# Patient Record
Sex: FEMALE | Race: WHITE
[De-identification: names, ages, dates, MRNs, and addresses within clinical notes are randomized per-mention and may not be internally consistent; named-entity substitution may affect disease eponyms.]

---

## 2020-10-17 ENCOUNTER — HOSPITAL ENCOUNTER (INPATIENT)
Dept: HOSPITAL 15 - ER | Age: 25
LOS: 2 days | DRG: 812 | End: 2020-10-19
Attending: HOSPITALIST | Admitting: HOSPITALIST
Payer: COMMERCIAL

## 2020-10-17 VITALS — SYSTOLIC BLOOD PRESSURE: 117 MMHG | DIASTOLIC BLOOD PRESSURE: 69 MMHG

## 2020-10-17 VITALS — DIASTOLIC BLOOD PRESSURE: 58 MMHG | SYSTOLIC BLOOD PRESSURE: 121 MMHG

## 2020-10-17 VITALS — DIASTOLIC BLOOD PRESSURE: 61 MMHG | SYSTOLIC BLOOD PRESSURE: 117 MMHG

## 2020-10-17 VITALS — DIASTOLIC BLOOD PRESSURE: 55 MMHG | SYSTOLIC BLOOD PRESSURE: 96 MMHG

## 2020-10-17 VITALS — SYSTOLIC BLOOD PRESSURE: 98 MMHG | DIASTOLIC BLOOD PRESSURE: 44 MMHG

## 2020-10-17 VITALS — SYSTOLIC BLOOD PRESSURE: 101 MMHG | DIASTOLIC BLOOD PRESSURE: 49 MMHG

## 2020-10-17 VITALS — DIASTOLIC BLOOD PRESSURE: 66 MMHG | SYSTOLIC BLOOD PRESSURE: 113 MMHG

## 2020-10-17 VITALS — SYSTOLIC BLOOD PRESSURE: 92 MMHG | DIASTOLIC BLOOD PRESSURE: 53 MMHG

## 2020-10-17 VITALS — DIASTOLIC BLOOD PRESSURE: 52 MMHG | SYSTOLIC BLOOD PRESSURE: 114 MMHG

## 2020-10-17 VITALS — SYSTOLIC BLOOD PRESSURE: 94 MMHG | DIASTOLIC BLOOD PRESSURE: 48 MMHG

## 2020-10-17 VITALS — SYSTOLIC BLOOD PRESSURE: 88 MMHG | DIASTOLIC BLOOD PRESSURE: 43 MMHG

## 2020-10-17 VITALS — SYSTOLIC BLOOD PRESSURE: 87 MMHG | DIASTOLIC BLOOD PRESSURE: 41 MMHG

## 2020-10-17 VITALS — SYSTOLIC BLOOD PRESSURE: 101 MMHG | DIASTOLIC BLOOD PRESSURE: 53 MMHG

## 2020-10-17 VITALS — SYSTOLIC BLOOD PRESSURE: 138 MMHG | DIASTOLIC BLOOD PRESSURE: 118 MMHG

## 2020-10-17 VITALS — SYSTOLIC BLOOD PRESSURE: 102 MMHG | DIASTOLIC BLOOD PRESSURE: 56 MMHG

## 2020-10-17 VITALS — WEIGHT: 235.01 LBS | BODY MASS INDEX: 36.89 KG/M2 | HEIGHT: 67 IN

## 2020-10-17 VITALS — SYSTOLIC BLOOD PRESSURE: 99 MMHG | DIASTOLIC BLOOD PRESSURE: 64 MMHG

## 2020-10-17 VITALS — DIASTOLIC BLOOD PRESSURE: 67 MMHG | SYSTOLIC BLOOD PRESSURE: 129 MMHG

## 2020-10-17 VITALS — SYSTOLIC BLOOD PRESSURE: 86 MMHG | DIASTOLIC BLOOD PRESSURE: 51 MMHG

## 2020-10-17 VITALS — DIASTOLIC BLOOD PRESSURE: 65 MMHG | SYSTOLIC BLOOD PRESSURE: 106 MMHG

## 2020-10-17 VITALS — SYSTOLIC BLOOD PRESSURE: 98 MMHG | DIASTOLIC BLOOD PRESSURE: 51 MMHG

## 2020-10-17 VITALS — DIASTOLIC BLOOD PRESSURE: 44 MMHG | SYSTOLIC BLOOD PRESSURE: 97 MMHG

## 2020-10-17 VITALS — SYSTOLIC BLOOD PRESSURE: 78 MMHG | DIASTOLIC BLOOD PRESSURE: 37 MMHG

## 2020-10-17 VITALS — SYSTOLIC BLOOD PRESSURE: 100 MMHG | DIASTOLIC BLOOD PRESSURE: 37 MMHG

## 2020-10-17 VITALS — SYSTOLIC BLOOD PRESSURE: 91 MMHG | DIASTOLIC BLOOD PRESSURE: 22 MMHG

## 2020-10-17 VITALS — DIASTOLIC BLOOD PRESSURE: 46 MMHG | SYSTOLIC BLOOD PRESSURE: 92 MMHG

## 2020-10-17 VITALS — DIASTOLIC BLOOD PRESSURE: 57 MMHG | SYSTOLIC BLOOD PRESSURE: 96 MMHG

## 2020-10-17 VITALS — SYSTOLIC BLOOD PRESSURE: 109 MMHG | DIASTOLIC BLOOD PRESSURE: 66 MMHG

## 2020-10-17 VITALS — SYSTOLIC BLOOD PRESSURE: 98 MMHG | DIASTOLIC BLOOD PRESSURE: 42 MMHG

## 2020-10-17 VITALS — DIASTOLIC BLOOD PRESSURE: 67 MMHG | SYSTOLIC BLOOD PRESSURE: 130 MMHG

## 2020-10-17 VITALS — SYSTOLIC BLOOD PRESSURE: 82 MMHG | DIASTOLIC BLOOD PRESSURE: 35 MMHG

## 2020-10-17 VITALS — SYSTOLIC BLOOD PRESSURE: 92 MMHG | DIASTOLIC BLOOD PRESSURE: 46 MMHG

## 2020-10-17 VITALS — DIASTOLIC BLOOD PRESSURE: 68 MMHG | SYSTOLIC BLOOD PRESSURE: 119 MMHG

## 2020-10-17 VITALS — SYSTOLIC BLOOD PRESSURE: 113 MMHG | DIASTOLIC BLOOD PRESSURE: 55 MMHG

## 2020-10-17 VITALS — DIASTOLIC BLOOD PRESSURE: 58 MMHG | SYSTOLIC BLOOD PRESSURE: 111 MMHG

## 2020-10-17 VITALS — SYSTOLIC BLOOD PRESSURE: 110 MMHG | DIASTOLIC BLOOD PRESSURE: 63 MMHG

## 2020-10-17 VITALS — DIASTOLIC BLOOD PRESSURE: 48 MMHG | SYSTOLIC BLOOD PRESSURE: 98 MMHG

## 2020-10-17 VITALS — DIASTOLIC BLOOD PRESSURE: 23 MMHG | SYSTOLIC BLOOD PRESSURE: 92 MMHG

## 2020-10-17 VITALS — SYSTOLIC BLOOD PRESSURE: 121 MMHG | DIASTOLIC BLOOD PRESSURE: 72 MMHG

## 2020-10-17 VITALS — DIASTOLIC BLOOD PRESSURE: 44 MMHG | SYSTOLIC BLOOD PRESSURE: 98 MMHG

## 2020-10-17 VITALS — SYSTOLIC BLOOD PRESSURE: 82 MMHG | DIASTOLIC BLOOD PRESSURE: 40 MMHG

## 2020-10-17 DIAGNOSIS — E87.2: ICD-10-CM

## 2020-10-17 DIAGNOSIS — K72.90: ICD-10-CM

## 2020-10-17 DIAGNOSIS — I46.9: ICD-10-CM

## 2020-10-17 DIAGNOSIS — R57.1: ICD-10-CM

## 2020-10-17 DIAGNOSIS — J96.90: ICD-10-CM

## 2020-10-17 DIAGNOSIS — I21.9: ICD-10-CM

## 2020-10-17 DIAGNOSIS — T51.91XA: ICD-10-CM

## 2020-10-17 DIAGNOSIS — Z83.3: ICD-10-CM

## 2020-10-17 DIAGNOSIS — T40.2X1A: ICD-10-CM

## 2020-10-17 DIAGNOSIS — G93.1: ICD-10-CM

## 2020-10-17 DIAGNOSIS — G92: ICD-10-CM

## 2020-10-17 DIAGNOSIS — G93.6: ICD-10-CM

## 2020-10-17 DIAGNOSIS — F11.10: ICD-10-CM

## 2020-10-17 DIAGNOSIS — F10.20: ICD-10-CM

## 2020-10-17 DIAGNOSIS — Z82.49: ICD-10-CM

## 2020-10-17 DIAGNOSIS — Z20.828: ICD-10-CM

## 2020-10-17 DIAGNOSIS — T39.1X1A: Primary | ICD-10-CM

## 2020-10-17 DIAGNOSIS — I95.9: ICD-10-CM

## 2020-10-17 DIAGNOSIS — K72.01: ICD-10-CM

## 2020-10-17 LAB
ALBUMIN SERPL-MCNC: 2.4 G/DL (ref 3.4–5)
ALBUMIN SERPL-MCNC: 3 G/DL (ref 3.4–5)
ALCOHOL, URINE: 186 MG/DL (ref 0–10)
ALP SERPL-CCNC: 243 U/L (ref 45–117)
ALP SERPL-CCNC: 94 U/L (ref 45–117)
ALT SERPL-CCNC: 2198 U/L (ref 13–56)
ALT SERPL-CCNC: 3497 U/L (ref 13–56)
AMPHETAMINES UR QL SCN: NEGATIVE
AMYLASE SERPL-CCNC: 264 U/L (ref 25–115)
ANION GAP SERPL CALCULATED.3IONS-SCNC: 19 MMOL/L (ref 5–15)
ANION GAP SERPL CALCULATED.3IONS-SCNC: 25 MMOL/L (ref 5–15)
APTT PPP: 54.4 SEC (ref 23–31.2)
BARBITURATES UR QL SCN: NEGATIVE
BENZODIAZ UR QL SCN: NEGATIVE
BILIRUB SERPL-MCNC: 0.4 MG/DL (ref 0.2–1)
BILIRUB SERPL-MCNC: 0.7 MG/DL (ref 0.2–1)
BUN SERPL-MCNC: 10 MG/DL (ref 7–18)
BUN SERPL-MCNC: 14 MG/DL (ref 7–18)
BUN/CREAT SERPL: 6.1
BUN/CREAT SERPL: 7
BZE UR QL SCN: NEGATIVE
CALCIUM SERPL-MCNC: 7.4 MG/DL (ref 8.5–10.1)
CALCIUM SERPL-MCNC: 9.5 MG/DL (ref 8.5–10.1)
CANNABINOIDS UR QL SCN: NEGATIVE
CHLORIDE SERPL-SCNC: 105 MMOL/L (ref 98–107)
CHLORIDE SERPL-SCNC: 111 MMOL/L (ref 98–107)
CO2 SERPL-SCNC: 13 MMOL/L (ref 21–32)
CO2 SERPL-SCNC: 17 MMOL/L (ref 21–32)
GLUCOSE SERPL-MCNC: 201 MG/DL (ref 74–106)
GLUCOSE SERPL-MCNC: 361 MG/DL (ref 74–106)
HCT VFR BLD AUTO: 40.9 % (ref 36–46)
HCT VFR BLD AUTO: 41.2 % (ref 36–46)
HGB BLD-MCNC: 12.2 G/DL (ref 12.2–16.2)
HGB BLD-MCNC: 12.9 G/DL (ref 12.2–16.2)
INR PPP: 1.32 (ref 0.9–1.15)
LACTATE PLASV-SCNC: 11.5 MMOL/L (ref 0.4–2)
LACTATE PLASV-SCNC: 18.9 MMOL/L (ref 0.4–2)
LIPASE SERPL-CCNC: 197 U/L (ref 73–393)
MCH RBC QN AUTO: 27.1 PG (ref 28–32)
MCH RBC QN AUTO: 27.4 PG (ref 28–32)
MCV RBC AUTO: 86.9 FL (ref 80–100)
MCV RBC AUTO: 91.2 FL (ref 80–100)
OPIATES UR QL SCN: NEGATIVE
PCP UR QL SCN: NEGATIVE
POTASSIUM SERPL-SCNC: 3.6 MMOL/L (ref 3.5–5.1)
POTASSIUM SERPL-SCNC: 5.5 MMOL/L (ref 3.5–5.1)
PROT SERPL-MCNC: 5.7 G/DL (ref 6.4–8.2)
PROT SERPL-MCNC: 6.5 G/DL (ref 6.4–8.2)
SODIUM SERPL-SCNC: 143 MMOL/L (ref 136–145)
SODIUM SERPL-SCNC: 147 MMOL/L (ref 136–145)

## 2020-10-17 PROCEDURE — 31500 INSERT EMERGENCY AIRWAY: CPT

## 2020-10-17 PROCEDURE — 83605 ASSAY OF LACTIC ACID: CPT

## 2020-10-17 PROCEDURE — 36415 COLL VENOUS BLD VENIPUNCTURE: CPT

## 2020-10-17 PROCEDURE — 96361 HYDRATE IV INFUSION ADD-ON: CPT

## 2020-10-17 PROCEDURE — 99291 CRITICAL CARE FIRST HOUR: CPT

## 2020-10-17 PROCEDURE — 87205 SMEAR GRAM STAIN: CPT

## 2020-10-17 PROCEDURE — 82150 ASSAY OF AMYLASE: CPT

## 2020-10-17 PROCEDURE — 87426 SARSCOV CORONAVIRUS AG IA: CPT

## 2020-10-17 PROCEDURE — 5A12012 PERFORMANCE OF CARDIAC OUTPUT, SINGLE, MANUAL: ICD-10-PCS | Performed by: HOSPITALIST

## 2020-10-17 PROCEDURE — 87040 BLOOD CULTURE FOR BACTERIA: CPT

## 2020-10-17 PROCEDURE — 85007 BL SMEAR W/DIFF WBC COUNT: CPT

## 2020-10-17 PROCEDURE — 83690 ASSAY OF LIPASE: CPT

## 2020-10-17 PROCEDURE — 85730 THROMBOPLASTIN TIME PARTIAL: CPT

## 2020-10-17 PROCEDURE — 70450 CT HEAD/BRAIN W/O DYE: CPT

## 2020-10-17 PROCEDURE — 84443 ASSAY THYROID STIM HORMONE: CPT

## 2020-10-17 PROCEDURE — 87070 CULTURE OTHR SPECIMN AEROBIC: CPT

## 2020-10-17 PROCEDURE — 82962 GLUCOSE BLOOD TEST: CPT

## 2020-10-17 PROCEDURE — 0BH17EZ INSERTION OF ENDOTRACHEAL AIRWAY INTO TRACHEA, VIA NATURAL OR ARTIFICIAL OPENING: ICD-10-PCS | Performed by: HOSPITALIST

## 2020-10-17 PROCEDURE — 83880 ASSAY OF NATRIURETIC PEPTIDE: CPT

## 2020-10-17 PROCEDURE — 87077 CULTURE AEROBIC IDENTIFY: CPT

## 2020-10-17 PROCEDURE — 94003 VENT MGMT INPAT SUBQ DAY: CPT

## 2020-10-17 PROCEDURE — 80329 ANALGESICS NON-OPIOID 1 OR 2: CPT

## 2020-10-17 PROCEDURE — 85379 FIBRIN DEGRADATION QUANT: CPT

## 2020-10-17 PROCEDURE — 93306 TTE W/DOPPLER COMPLETE: CPT

## 2020-10-17 PROCEDURE — 36600 WITHDRAWAL OF ARTERIAL BLOOD: CPT

## 2020-10-17 PROCEDURE — 02HV33Z INSERTION OF INFUSION DEVICE INTO SUPERIOR VENA CAVA, PERCUTANEOUS APPROACH: ICD-10-PCS

## 2020-10-17 PROCEDURE — 96365 THER/PROPH/DIAG IV INF INIT: CPT

## 2020-10-17 PROCEDURE — 87081 CULTURE SCREEN ONLY: CPT

## 2020-10-17 PROCEDURE — 85610 PROTHROMBIN TIME: CPT

## 2020-10-17 PROCEDURE — 84484 ASSAY OF TROPONIN QUANT: CPT

## 2020-10-17 PROCEDURE — 82805 BLOOD GASES W/O2 SATURATION: CPT

## 2020-10-17 PROCEDURE — 87186 SC STD MICRODIL/AGAR DIL: CPT

## 2020-10-17 PROCEDURE — 82140 ASSAY OF AMMONIA: CPT

## 2020-10-17 PROCEDURE — 94002 VENT MGMT INPAT INIT DAY: CPT

## 2020-10-17 PROCEDURE — 80320 DRUG SCREEN QUANTALCOHOLS: CPT

## 2020-10-17 PROCEDURE — 80053 COMPREHEN METABOLIC PANEL: CPT

## 2020-10-17 PROCEDURE — 95819 EEG AWAKE AND ASLEEP: CPT

## 2020-10-17 PROCEDURE — 80307 DRUG TEST PRSMV CHEM ANLYZR: CPT

## 2020-10-17 PROCEDURE — 36556 INSERT NON-TUNNEL CV CATH: CPT

## 2020-10-17 PROCEDURE — 85027 COMPLETE CBC AUTOMATED: CPT

## 2020-10-17 PROCEDURE — 5A1945Z RESPIRATORY VENTILATION, 24-96 CONSECUTIVE HOURS: ICD-10-PCS | Performed by: HOSPITALIST

## 2020-10-17 PROCEDURE — 71045 X-RAY EXAM CHEST 1 VIEW: CPT

## 2020-10-17 RX ADMIN — HUMAN INSULIN SCH UNITS: 100 INJECTION, SOLUTION SUBCUTANEOUS at 20:00

## 2020-10-17 RX ADMIN — SODIUM CHLORIDE SCH MLS/HR: 0.9 INJECTION, SOLUTION INTRAVENOUS at 22:35

## 2020-10-17 RX ADMIN — Medication SCH STRIP: at 12:00

## 2020-10-17 RX ADMIN — HUMAN INSULIN SCH UNITS: 100 INJECTION, SOLUTION SUBCUTANEOUS at 16:00

## 2020-10-17 RX ADMIN — SODIUM CHLORIDE SCH MLS/HR: 0.9 INJECTION, SOLUTION INTRAVENOUS at 19:45

## 2020-10-17 RX ADMIN — NOREPINEPHRINE BITARTRATE SCH MLS/HR: 1 INJECTION, SOLUTION, CONCENTRATE INTRAVENOUS at 16:38

## 2020-10-17 RX ADMIN — NOREPINEPHRINE BITARTRATE SCH MLS/HR: 1 INJECTION, SOLUTION, CONCENTRATE INTRAVENOUS at 07:08

## 2020-10-17 RX ADMIN — DOBUTAMINE IN DEXTROSE SCH MLS/HR: 100 INJECTION, SOLUTION INTRAVENOUS at 22:15

## 2020-10-17 RX ADMIN — HUMAN INSULIN SCH UNITS: 100 INJECTION, SOLUTION SUBCUTANEOUS at 12:00

## 2020-10-17 RX ADMIN — SODIUM CHLORIDE SCH MLS/HR: 0.9 INJECTION, SOLUTION INTRAVENOUS at 21:45

## 2020-10-17 RX ADMIN — EPINEPHRINE SCH MLS/HR: 1 INJECTION, SOLUTION, CONCENTRATE INTRAVENOUS at 15:30

## 2020-10-17 RX ADMIN — NOREPINEPHRINE BITARTRATE SCH MLS/HR: 1 INJECTION, SOLUTION, CONCENTRATE INTRAVENOUS at 08:16

## 2020-10-17 RX ADMIN — PIPERACILLIN SODIUM AND TAZOBACTAM SODIUM SCH MLS/HR: .375; 3 INJECTION, POWDER, LYOPHILIZED, FOR SOLUTION INTRAVENOUS at 16:46

## 2020-10-17 RX ADMIN — Medication SCH STRIP: at 16:00

## 2020-10-17 RX ADMIN — DOPAMINE HYDROCHLORIDE IN DEXTROSE SCH MLS/HR: 1.6 INJECTION, SOLUTION INTRAVENOUS at 22:10

## 2020-10-17 RX ADMIN — DOBUTAMINE IN DEXTROSE SCH MLS/HR: 100 INJECTION, SOLUTION INTRAVENOUS at 12:00

## 2020-10-17 RX ADMIN — EPINEPHRINE SCH MLS/HR: 1 INJECTION, SOLUTION, CONCENTRATE INTRAVENOUS at 21:05

## 2020-10-17 RX ADMIN — DEXTROSE SCH MLS/HR: 50 INJECTION, SOLUTION INTRAVENOUS at 13:00

## 2020-10-17 RX ADMIN — SODIUM CHLORIDE SCH MLS/HR: 0.9 INJECTION, SOLUTION INTRAVENOUS at 23:45

## 2020-10-17 RX ADMIN — PHENYLEPHRINE HYDROCHLORIDE SCH MLS/HR: 10 INJECTION INTRAVENOUS at 16:37

## 2020-10-17 RX ADMIN — DEXTROSE SCH MLS/HR: 5 SOLUTION INTRAVENOUS at 17:43

## 2020-10-17 RX ADMIN — PHENYLEPHRINE HYDROCHLORIDE SCH MLS/HR: 10 INJECTION INTRAVENOUS at 10:00

## 2020-10-17 RX ADMIN — Medication SCH STRIP: at 20:00

## 2020-10-17 RX ADMIN — PHENYLEPHRINE HYDROCHLORIDE SCH MLS/HR: 10 INJECTION INTRAVENOUS at 14:24

## 2020-10-17 NOTE — NUR
Page sent to Dr. Yadav in regards to low to absent blood pressures. New order received to 
restart dopamine. Also notified him that family is on their way up to the hospital given 
patient poor prognosis.

## 2020-10-17 NOTE — NUR
WOUND CARE NOTE: PATIENT RECENTLY ADMITTED TO [facility] WITH DIAGNOSIS OF S/P CARDIAC ARREST. SHE 
IS INTUBATED, NON RESPONSIVE. PATIENT ON MULTIPLE VASOPRESSORS, RESTING ON ICU LOW AIRLOSS 
BED.  CURRENT GARRETT SCORE IS 10.  PATIENT IS UNRESPONSIVE, S/P CPR, WITH ANOXIC BRAIN 
INJURY, HYPOVOLEMIC SHOCK, MULTI ORGAN FAILURE. PATIENT IS CURRENTLY WOUND FREE AT THIS 
TIME.



RECOMMEND: FREQUENT TURN SCHEDULE Q 2HOURS, PRN AS CONDITION PERMITS, WITH PRESSURE 
REDISTRIBUTION USING PILLOWS/WEDGES, BID/PRN APPLICATION MOISTURE BARRIER CREAM, OPTIFOAM 
GENTLE SACRAL DRESSING AS PREVENTATIVE, DIETARY CONSULT FOR INTUBATION STATUS, SKIN/WOUND 
CARE PLAN, CONTINUED MONITORING BY WOUND CARE TEAM.

## 2020-10-17 NOTE — NUR
Respiratory note:

AT BEDSIDE FOR ROUTINE VENT CHECK. CRASH CART AT BEDSIDE DUE TO CHANGE HR AND BP.  PTS 
FAMILY ARRIVED TO BEDSIDE AT THIS TIME.  PTS CURRENT TEMP READS 99.5F. WILL CONTINUE TO 
MONITOR.

## 2020-10-17 NOTE — NUR
ELIMINATION

 PATIENT HAD A LARGE, FOUL, LIQUID, MUCOID BM WITH SOLIDS PIECES NOTED AND  PATIENT BECAME 
HYPOTENSIVE IN THE 70'S.  COMPLETE BED CHANGE REQUIRED. PRESSORS INCREASED. SEE IV 
SPREADSHEET.

## 2020-10-17 NOTE — NUR
Respiratory note:

RECEIVED PT ON VENT V14, VENT CONNECTED TO RED OUTLET AND O2 SOURCE ALARMS ARE SET AND 
AUDIBLE AMBU BAG AND MASK AT BEDSIDE. BS ARE FINE COURSE, SXD VIA ETT FOR MODERATE THICK 
TAN/CREAMY SECRETIONS. NO GAG SEEN WITH SXD. RT NAME AND PAGER ASSIGNMENT WRITTEN ON PTS 
ROOM BOARD. PTS CURRENT TEMP READING AT 96.8F. WARMING MEASURES BEING TAKEN.  WILL CONTINUE 
TO MONITOR.

## 2020-10-17 NOTE — NUR
FAMILY

 MOTHER, NATALYA UPDATED ON PATIENTS STATUS. MOTHER AWARE OF PATIENTS POOR PROGNOSIS AT 
THIS TIME. PER MOTHER PATIENT TO REMAIN FULL CODE AT THIS TIME DESPITE PATIENTS POOR 
FINDINGS. 



VISITING LIMITATION REVIEWED WITH MOTHER.



 PER HOUSE SUPERVISOR, OK FOR MOTHER TO VISIT EVERY DAY FOR 1HR.

## 2020-10-17 NOTE — NUR
Admit to ICU from ER on vent

GINETTE AHUJA admitted to ICU via gurney on cardiac monitor, intubated and being bagged 
by Respiratory Therapist. Patient transferred to bed, connected to mechanical ventilator by 
therapist, EARLINE at bedside.  Patient connected to ICU monitoring, weighed by bedscale, 
oriented to Dyan Monge primary RN, unit, ventilator and sedation.

 pupils noted fixed and dilated at 5. No gag reflex, no sedation infusing. Patient 
mechanically ventilated at 100% fi02 with current pox 70-82. R.t at bedside Patient on 
multiple pressors. bp 80's, patient tachycardic, dopamine titrated down due to hr 130's.  
See iv spread sheet. All pressors infusing to right subclavian. Site noted to ooz blood 
around site.- line noted good blood return and no resistance.  

Ng to left nare placed to lcs with  brown/maroon secretions mixed with solid pieces. Camacho 
noted to have approx 250cc of clear yellow urine.  General skin intact / See further notes.

## 2020-10-17 NOTE — NUR
[facility]

 [facility] UPDATED ON PATIENTS STATUS PER MD REQUEST. 

 REF#[id number].



 [facility] TO BE CALLED IF FAMILY DECIDES TO WITHDRAWAL CARE BEFORE STAFF REPRESENTATIVE 
ARRIVES.

## 2020-10-17 NOTE — NUR
Respiratory note:

AT BEDSIDE FOR ROUTINE VENT CHECK. FIO2 TITRATES TO 50%. PT TOLERATING CHANGE WELL. HELEN GRACIA 
AT BEDSIDE AND COMMUNICATED ON O2 CHANGE. PTS CURRENT TEMP READS 102.0F. COOLING MEASURES 
BEING TAKEN. SXD VIA ETT FOR SCANT THICK JARA. WILL CONTINUE TO MONITOR.

## 2020-10-17 NOTE — NUR
Page sent to Dr. Yadav (on call for Dr. Virgen); notified him about patient status. New 
orders received for acetaminophen IV Q12H prn for temp greater than 100.4 (currently 102.0 
rectal); rectal tube insertion for copious mucoid/bloody stool; double concentration orders 
for levophed and geovani.

## 2020-10-17 NOTE — NUR
Dr. Recinos at bedside for neurology consultation. Spoke with family about neurological status 
of patient. Family wants the patient to remain a full code at this time.

## 2020-10-17 NOTE — NUR
Opening Shift Note:

Patient is intubated/sedated. ET size 7.5/23 @ lip. Settings: AC rate 22, vT 450, FiO2 80%, 
and PEEP 5. Neuro: pupils are bilateral 7 mm/fixed/no accommodation present; extremities are 
flaccid. Cardio: ST in the 130s-140s; SBPs 110-130s; no edema noted; pulses are weak with 
doppler assistance. Resp: anterior lung sounds throughout inspiratory wheeze/coarse; ET 
secretions none; oral secretions small rust/blood thin. GI: L nare NGT to LIS with 
rust/brown output; BS present; large copious mucoid/bloody stool that is liquid. : russell 
inserted on 10/17/20 for strict I/O; yellow/cloudy. Skin intact. IV: right subclavian TLC 
inserted on 10/17/20 running Vasopressin @ 0.05; Levo @ 30; Jaden @ 180; NaBicarb (3 amps) @ 
125 ml/hr; IV 20 g left AC running TKO/ABX inserted on 10/17/20. Pending neurology/pulmonary 
consultations; ABG/CXR/CMP/CBC in AM. Will continue to round/reposition/perform oral care 
prn.

## 2020-10-17 NOTE — NUR
Respiratory note:

AT BEDSIDE FOR ROUTINE VENT CHECK. FIO2 TITRATES TO 60%. PT TOLERATING CHANGE WELL. RN SAMIA 
COMMUNICATED ON O2 CHANGE.  PTS CURRENT TEMP IS 98.8F. NO SXD DONE AT THIS TIME. WILL 
CONTINUE TO MONITOR.

## 2020-10-17 NOTE — NUR
MD ROUNDS

 DR. MERRILL UPDATED ON PATIENTS STATUS. NEW ORDERS IN PLACE. 

 MD AWARE THERAPEUTIC HYPOTHERMIA WAS NOT INITIATED. PATIENT CURRENTLY HYPOTHERMIC. MD 
STATED OK TO START TO WARM PATIENT. WARMING MEASURES IN PLACE.  PER MD. [facility] TO BE 
NOTIFIED.

## 2020-10-18 VITALS — SYSTOLIC BLOOD PRESSURE: 86 MMHG | DIASTOLIC BLOOD PRESSURE: 34 MMHG

## 2020-10-18 VITALS — DIASTOLIC BLOOD PRESSURE: 34 MMHG | SYSTOLIC BLOOD PRESSURE: 96 MMHG

## 2020-10-18 VITALS — SYSTOLIC BLOOD PRESSURE: 92 MMHG | DIASTOLIC BLOOD PRESSURE: 34 MMHG

## 2020-10-18 VITALS — DIASTOLIC BLOOD PRESSURE: 23 MMHG | SYSTOLIC BLOOD PRESSURE: 76 MMHG

## 2020-10-18 VITALS — SYSTOLIC BLOOD PRESSURE: 105 MMHG | DIASTOLIC BLOOD PRESSURE: 42 MMHG

## 2020-10-18 VITALS — DIASTOLIC BLOOD PRESSURE: 25 MMHG | SYSTOLIC BLOOD PRESSURE: 73 MMHG

## 2020-10-18 VITALS — SYSTOLIC BLOOD PRESSURE: 74 MMHG | DIASTOLIC BLOOD PRESSURE: 25 MMHG

## 2020-10-18 VITALS — SYSTOLIC BLOOD PRESSURE: 53 MMHG | DIASTOLIC BLOOD PRESSURE: 25 MMHG

## 2020-10-18 VITALS — SYSTOLIC BLOOD PRESSURE: 79 MMHG | DIASTOLIC BLOOD PRESSURE: 17 MMHG

## 2020-10-18 VITALS — DIASTOLIC BLOOD PRESSURE: 19 MMHG | SYSTOLIC BLOOD PRESSURE: 81 MMHG

## 2020-10-18 VITALS — SYSTOLIC BLOOD PRESSURE: 102 MMHG | DIASTOLIC BLOOD PRESSURE: 39 MMHG

## 2020-10-18 VITALS — DIASTOLIC BLOOD PRESSURE: 45 MMHG | SYSTOLIC BLOOD PRESSURE: 107 MMHG

## 2020-10-18 VITALS — SYSTOLIC BLOOD PRESSURE: 59 MMHG | DIASTOLIC BLOOD PRESSURE: 18 MMHG

## 2020-10-18 VITALS — SYSTOLIC BLOOD PRESSURE: 100 MMHG | DIASTOLIC BLOOD PRESSURE: 34 MMHG

## 2020-10-18 VITALS — DIASTOLIC BLOOD PRESSURE: 26 MMHG | SYSTOLIC BLOOD PRESSURE: 61 MMHG

## 2020-10-18 VITALS — DIASTOLIC BLOOD PRESSURE: 19 MMHG | SYSTOLIC BLOOD PRESSURE: 61 MMHG

## 2020-10-18 VITALS — SYSTOLIC BLOOD PRESSURE: 88 MMHG | DIASTOLIC BLOOD PRESSURE: 27 MMHG

## 2020-10-18 VITALS — DIASTOLIC BLOOD PRESSURE: 36 MMHG | SYSTOLIC BLOOD PRESSURE: 90 MMHG

## 2020-10-18 VITALS — SYSTOLIC BLOOD PRESSURE: 68 MMHG | DIASTOLIC BLOOD PRESSURE: 20 MMHG

## 2020-10-18 VITALS — DIASTOLIC BLOOD PRESSURE: 18 MMHG | SYSTOLIC BLOOD PRESSURE: 65 MMHG

## 2020-10-18 VITALS — SYSTOLIC BLOOD PRESSURE: 100 MMHG | DIASTOLIC BLOOD PRESSURE: 36 MMHG

## 2020-10-18 VITALS — SYSTOLIC BLOOD PRESSURE: 94 MMHG | DIASTOLIC BLOOD PRESSURE: 34 MMHG

## 2020-10-18 VITALS — SYSTOLIC BLOOD PRESSURE: 89 MMHG | DIASTOLIC BLOOD PRESSURE: 31 MMHG

## 2020-10-18 VITALS — SYSTOLIC BLOOD PRESSURE: 89 MMHG | DIASTOLIC BLOOD PRESSURE: 36 MMHG

## 2020-10-18 VITALS — SYSTOLIC BLOOD PRESSURE: 99 MMHG | DIASTOLIC BLOOD PRESSURE: 35 MMHG

## 2020-10-18 VITALS — SYSTOLIC BLOOD PRESSURE: 69 MMHG | DIASTOLIC BLOOD PRESSURE: 14 MMHG

## 2020-10-18 VITALS — DIASTOLIC BLOOD PRESSURE: 31 MMHG | SYSTOLIC BLOOD PRESSURE: 65 MMHG

## 2020-10-18 VITALS — DIASTOLIC BLOOD PRESSURE: 26 MMHG | SYSTOLIC BLOOD PRESSURE: 65 MMHG

## 2020-10-18 VITALS — SYSTOLIC BLOOD PRESSURE: 93 MMHG | DIASTOLIC BLOOD PRESSURE: 34 MMHG

## 2020-10-18 VITALS — SYSTOLIC BLOOD PRESSURE: 86 MMHG | DIASTOLIC BLOOD PRESSURE: 29 MMHG

## 2020-10-18 VITALS — SYSTOLIC BLOOD PRESSURE: 59 MMHG | DIASTOLIC BLOOD PRESSURE: 24 MMHG

## 2020-10-18 VITALS — SYSTOLIC BLOOD PRESSURE: 105 MMHG | DIASTOLIC BLOOD PRESSURE: 43 MMHG

## 2020-10-18 VITALS — SYSTOLIC BLOOD PRESSURE: 81 MMHG | DIASTOLIC BLOOD PRESSURE: 15 MMHG

## 2020-10-18 VITALS — SYSTOLIC BLOOD PRESSURE: 99 MMHG | DIASTOLIC BLOOD PRESSURE: 26 MMHG

## 2020-10-18 VITALS — DIASTOLIC BLOOD PRESSURE: 24 MMHG | SYSTOLIC BLOOD PRESSURE: 60 MMHG

## 2020-10-18 VITALS — SYSTOLIC BLOOD PRESSURE: 72 MMHG | DIASTOLIC BLOOD PRESSURE: 17 MMHG

## 2020-10-18 VITALS — DIASTOLIC BLOOD PRESSURE: 34 MMHG | SYSTOLIC BLOOD PRESSURE: 101 MMHG

## 2020-10-18 VITALS — SYSTOLIC BLOOD PRESSURE: 85 MMHG | DIASTOLIC BLOOD PRESSURE: 34 MMHG

## 2020-10-18 VITALS — DIASTOLIC BLOOD PRESSURE: 32 MMHG | SYSTOLIC BLOOD PRESSURE: 80 MMHG

## 2020-10-18 VITALS — SYSTOLIC BLOOD PRESSURE: 83 MMHG | DIASTOLIC BLOOD PRESSURE: 26 MMHG

## 2020-10-18 VITALS — DIASTOLIC BLOOD PRESSURE: 33 MMHG | SYSTOLIC BLOOD PRESSURE: 78 MMHG

## 2020-10-18 VITALS — DIASTOLIC BLOOD PRESSURE: 39 MMHG | SYSTOLIC BLOOD PRESSURE: 102 MMHG

## 2020-10-18 VITALS — DIASTOLIC BLOOD PRESSURE: 15 MMHG | SYSTOLIC BLOOD PRESSURE: 81 MMHG

## 2020-10-18 VITALS — SYSTOLIC BLOOD PRESSURE: 81 MMHG | DIASTOLIC BLOOD PRESSURE: 22 MMHG

## 2020-10-18 VITALS — DIASTOLIC BLOOD PRESSURE: 30 MMHG | SYSTOLIC BLOOD PRESSURE: 83 MMHG

## 2020-10-18 VITALS — DIASTOLIC BLOOD PRESSURE: 34 MMHG | SYSTOLIC BLOOD PRESSURE: 97 MMHG

## 2020-10-18 VITALS — SYSTOLIC BLOOD PRESSURE: 61 MMHG | DIASTOLIC BLOOD PRESSURE: 25 MMHG

## 2020-10-18 VITALS — DIASTOLIC BLOOD PRESSURE: 22 MMHG | SYSTOLIC BLOOD PRESSURE: 55 MMHG

## 2020-10-18 VITALS — DIASTOLIC BLOOD PRESSURE: 22 MMHG | SYSTOLIC BLOOD PRESSURE: 54 MMHG

## 2020-10-18 VITALS — DIASTOLIC BLOOD PRESSURE: 41 MMHG | SYSTOLIC BLOOD PRESSURE: 105 MMHG

## 2020-10-18 VITALS — SYSTOLIC BLOOD PRESSURE: 72 MMHG | DIASTOLIC BLOOD PRESSURE: 18 MMHG

## 2020-10-18 VITALS — DIASTOLIC BLOOD PRESSURE: 35 MMHG | SYSTOLIC BLOOD PRESSURE: 99 MMHG

## 2020-10-18 VITALS — DIASTOLIC BLOOD PRESSURE: 24 MMHG | SYSTOLIC BLOOD PRESSURE: 62 MMHG

## 2020-10-18 VITALS — DIASTOLIC BLOOD PRESSURE: 24 MMHG | SYSTOLIC BLOOD PRESSURE: 61 MMHG

## 2020-10-18 VITALS — SYSTOLIC BLOOD PRESSURE: 101 MMHG | DIASTOLIC BLOOD PRESSURE: 40 MMHG

## 2020-10-18 VITALS — DIASTOLIC BLOOD PRESSURE: 26 MMHG | SYSTOLIC BLOOD PRESSURE: 56 MMHG

## 2020-10-18 VITALS — SYSTOLIC BLOOD PRESSURE: 54 MMHG | DIASTOLIC BLOOD PRESSURE: 21 MMHG

## 2020-10-18 VITALS — DIASTOLIC BLOOD PRESSURE: 28 MMHG | SYSTOLIC BLOOD PRESSURE: 63 MMHG

## 2020-10-18 VITALS — DIASTOLIC BLOOD PRESSURE: 17 MMHG | SYSTOLIC BLOOD PRESSURE: 35 MMHG

## 2020-10-18 VITALS — DIASTOLIC BLOOD PRESSURE: 43 MMHG | SYSTOLIC BLOOD PRESSURE: 109 MMHG

## 2020-10-18 VITALS — DIASTOLIC BLOOD PRESSURE: 35 MMHG | SYSTOLIC BLOOD PRESSURE: 102 MMHG

## 2020-10-18 VITALS — DIASTOLIC BLOOD PRESSURE: 25 MMHG | SYSTOLIC BLOOD PRESSURE: 61 MMHG

## 2020-10-18 VITALS — DIASTOLIC BLOOD PRESSURE: 33 MMHG | SYSTOLIC BLOOD PRESSURE: 80 MMHG

## 2020-10-18 VITALS — SYSTOLIC BLOOD PRESSURE: 55 MMHG | DIASTOLIC BLOOD PRESSURE: 25 MMHG

## 2020-10-18 VITALS — DIASTOLIC BLOOD PRESSURE: 27 MMHG | SYSTOLIC BLOOD PRESSURE: 75 MMHG

## 2020-10-18 VITALS — DIASTOLIC BLOOD PRESSURE: 40 MMHG | SYSTOLIC BLOOD PRESSURE: 100 MMHG

## 2020-10-18 VITALS — DIASTOLIC BLOOD PRESSURE: 20 MMHG | SYSTOLIC BLOOD PRESSURE: 61 MMHG

## 2020-10-18 VITALS — SYSTOLIC BLOOD PRESSURE: 75 MMHG | DIASTOLIC BLOOD PRESSURE: 21 MMHG

## 2020-10-18 VITALS — SYSTOLIC BLOOD PRESSURE: 60 MMHG | DIASTOLIC BLOOD PRESSURE: 21 MMHG

## 2020-10-18 VITALS — DIASTOLIC BLOOD PRESSURE: 17 MMHG | SYSTOLIC BLOOD PRESSURE: 66 MMHG

## 2020-10-18 VITALS — SYSTOLIC BLOOD PRESSURE: 96 MMHG | DIASTOLIC BLOOD PRESSURE: 33 MMHG

## 2020-10-18 VITALS — SYSTOLIC BLOOD PRESSURE: 88 MMHG | DIASTOLIC BLOOD PRESSURE: 31 MMHG

## 2020-10-18 VITALS — DIASTOLIC BLOOD PRESSURE: 18 MMHG | SYSTOLIC BLOOD PRESSURE: 67 MMHG

## 2020-10-18 VITALS — SYSTOLIC BLOOD PRESSURE: 73 MMHG | DIASTOLIC BLOOD PRESSURE: 25 MMHG

## 2020-10-18 VITALS — DIASTOLIC BLOOD PRESSURE: 35 MMHG | SYSTOLIC BLOOD PRESSURE: 90 MMHG

## 2020-10-18 VITALS — DIASTOLIC BLOOD PRESSURE: 36 MMHG | SYSTOLIC BLOOD PRESSURE: 98 MMHG

## 2020-10-18 VITALS — DIASTOLIC BLOOD PRESSURE: 21 MMHG | SYSTOLIC BLOOD PRESSURE: 45 MMHG

## 2020-10-18 VITALS — SYSTOLIC BLOOD PRESSURE: 63 MMHG | DIASTOLIC BLOOD PRESSURE: 25 MMHG

## 2020-10-18 VITALS — DIASTOLIC BLOOD PRESSURE: 27 MMHG | SYSTOLIC BLOOD PRESSURE: 66 MMHG

## 2020-10-18 VITALS — SYSTOLIC BLOOD PRESSURE: 71 MMHG | DIASTOLIC BLOOD PRESSURE: 20 MMHG

## 2020-10-18 VITALS — SYSTOLIC BLOOD PRESSURE: 88 MMHG | DIASTOLIC BLOOD PRESSURE: 33 MMHG

## 2020-10-18 VITALS — DIASTOLIC BLOOD PRESSURE: 37 MMHG | SYSTOLIC BLOOD PRESSURE: 99 MMHG

## 2020-10-18 VITALS — SYSTOLIC BLOOD PRESSURE: 71 MMHG | DIASTOLIC BLOOD PRESSURE: 22 MMHG

## 2020-10-18 VITALS — DIASTOLIC BLOOD PRESSURE: 33 MMHG | SYSTOLIC BLOOD PRESSURE: 88 MMHG

## 2020-10-18 VITALS — DIASTOLIC BLOOD PRESSURE: 23 MMHG | SYSTOLIC BLOOD PRESSURE: 74 MMHG

## 2020-10-18 VITALS — SYSTOLIC BLOOD PRESSURE: 67 MMHG | DIASTOLIC BLOOD PRESSURE: 18 MMHG

## 2020-10-18 VITALS — DIASTOLIC BLOOD PRESSURE: 39 MMHG | SYSTOLIC BLOOD PRESSURE: 103 MMHG

## 2020-10-18 VITALS — DIASTOLIC BLOOD PRESSURE: 26 MMHG | SYSTOLIC BLOOD PRESSURE: 60 MMHG

## 2020-10-18 VITALS — SYSTOLIC BLOOD PRESSURE: 60 MMHG | DIASTOLIC BLOOD PRESSURE: 27 MMHG

## 2020-10-18 VITALS — DIASTOLIC BLOOD PRESSURE: 21 MMHG | SYSTOLIC BLOOD PRESSURE: 62 MMHG

## 2020-10-18 VITALS — DIASTOLIC BLOOD PRESSURE: 17 MMHG | SYSTOLIC BLOOD PRESSURE: 51 MMHG

## 2020-10-18 VITALS — SYSTOLIC BLOOD PRESSURE: 52 MMHG | DIASTOLIC BLOOD PRESSURE: 24 MMHG

## 2020-10-18 VITALS — DIASTOLIC BLOOD PRESSURE: 39 MMHG | SYSTOLIC BLOOD PRESSURE: 96 MMHG

## 2020-10-18 VITALS — DIASTOLIC BLOOD PRESSURE: 37 MMHG | SYSTOLIC BLOOD PRESSURE: 95 MMHG

## 2020-10-18 VITALS — SYSTOLIC BLOOD PRESSURE: 72 MMHG | DIASTOLIC BLOOD PRESSURE: 25 MMHG

## 2020-10-18 VITALS — DIASTOLIC BLOOD PRESSURE: 28 MMHG | SYSTOLIC BLOOD PRESSURE: 88 MMHG

## 2020-10-18 LAB
ALBUMIN SERPL-MCNC: 1.8 G/DL (ref 3.4–5)
ALP SERPL-CCNC: 52 U/L (ref 45–117)
ALT SERPL-CCNC: 2406 U/L (ref 13–56)
ANION GAP SERPL CALCULATED.3IONS-SCNC: 20 MMOL/L (ref 5–15)
BILIRUB SERPL-MCNC: 1.2 MG/DL (ref 0.2–1)
BUN SERPL-MCNC: 17 MG/DL (ref 7–18)
BUN/CREAT SERPL: 4.1
CALCIUM SERPL-MCNC: 6.2 MG/DL (ref 8.5–10.1)
CHLORIDE SERPL-SCNC: 105 MMOL/L (ref 98–107)
CO2 SERPL-SCNC: 16 MMOL/L (ref 21–32)
GLUCOSE SERPL-MCNC: 319 MG/DL (ref 74–106)
HCT VFR BLD AUTO: 34.1 % (ref 36–46)
HGB BLD-MCNC: 10.4 G/DL (ref 12.2–16.2)
MCH RBC QN AUTO: 27.5 PG (ref 28–32)
MCV RBC AUTO: 90.4 FL (ref 80–100)
POTASSIUM SERPL-SCNC: 3.3 MMOL/L (ref 3.5–5.1)
PROT SERPL-MCNC: 4.1 G/DL (ref 6.4–8.2)
SODIUM SERPL-SCNC: 141 MMOL/L (ref 136–145)

## 2020-10-18 RX ADMIN — DOBUTAMINE IN DEXTROSE SCH MLS/HR: 100 INJECTION, SOLUTION INTRAVENOUS at 01:43

## 2020-10-18 RX ADMIN — HUMAN INSULIN SCH UNITS: 100 INJECTION, SOLUTION SUBCUTANEOUS at 14:10

## 2020-10-18 RX ADMIN — DOBUTAMINE IN DEXTROSE SCH MLS/HR: 100 INJECTION, SOLUTION INTRAVENOUS at 22:11

## 2020-10-18 RX ADMIN — HUMAN INSULIN SCH UNITS: 100 INJECTION, SOLUTION SUBCUTANEOUS at 04:19

## 2020-10-18 RX ADMIN — Medication SCH STRIP: at 12:00

## 2020-10-18 RX ADMIN — HUMAN INSULIN SCH UNITS: 100 INJECTION, SOLUTION SUBCUTANEOUS at 08:00

## 2020-10-18 RX ADMIN — HUMAN INSULIN SCH UNITS: 100 INJECTION, SOLUTION SUBCUTANEOUS at 16:43

## 2020-10-18 RX ADMIN — Medication SCH STRIP: at 01:15

## 2020-10-18 RX ADMIN — SODIUM CHLORIDE SCH MLS/HR: 0.9 INJECTION, SOLUTION INTRAVENOUS at 01:44

## 2020-10-18 RX ADMIN — DEXTROSE SCH MLS/HR: 50 INJECTION, SOLUTION INTRAVENOUS at 03:53

## 2020-10-18 RX ADMIN — SODIUM CHLORIDE SCH MLS/HR: 0.9 INJECTION, SOLUTION INTRAVENOUS at 02:05

## 2020-10-18 RX ADMIN — DOBUTAMINE IN DEXTROSE SCH MLS/HR: 100 INJECTION, SOLUTION INTRAVENOUS at 00:30

## 2020-10-18 RX ADMIN — PIPERACILLIN SODIUM AND TAZOBACTAM SODIUM SCH MLS/HR: .25; 2 INJECTION, POWDER, LYOPHILIZED, FOR SOLUTION INTRAVENOUS at 17:51

## 2020-10-18 RX ADMIN — DOPAMINE HYDROCHLORIDE IN DEXTROSE SCH MLS/HR: 1.6 INJECTION, SOLUTION INTRAVENOUS at 20:40

## 2020-10-18 RX ADMIN — SODIUM CHLORIDE SCH MLS/HR: 0.9 INJECTION, SOLUTION INTRAVENOUS at 10:25

## 2020-10-18 RX ADMIN — DOBUTAMINE IN DEXTROSE SCH MLS/HR: 100 INJECTION, SOLUTION INTRAVENOUS at 16:13

## 2020-10-18 RX ADMIN — DEXTROSE SCH MLS/HR: 50 INJECTION, SOLUTION INTRAVENOUS at 10:25

## 2020-10-18 RX ADMIN — DOPAMINE HYDROCHLORIDE IN DEXTROSE SCH MLS/HR: 1.6 INJECTION, SOLUTION INTRAVENOUS at 16:14

## 2020-10-18 RX ADMIN — DOBUTAMINE IN DEXTROSE SCH MLS/HR: 100 INJECTION, SOLUTION INTRAVENOUS at 03:54

## 2020-10-18 RX ADMIN — SODIUM CHLORIDE SCH MLS/HR: 0.9 INJECTION, SOLUTION INTRAVENOUS at 06:08

## 2020-10-18 RX ADMIN — DEXTROSE SCH MLS/HR: 5 SOLUTION INTRAVENOUS at 11:54

## 2020-10-18 RX ADMIN — Medication SCH STRIP: at 16:14

## 2020-10-18 RX ADMIN — DEXTROSE SCH MLS/HR: 5 SOLUTION INTRAVENOUS at 19:00

## 2020-10-18 RX ADMIN — SODIUM CHLORIDE SCH MLS/HR: 0.9 INJECTION, SOLUTION INTRAVENOUS at 16:53

## 2020-10-18 RX ADMIN — PIPERACILLIN SODIUM AND TAZOBACTAM SODIUM SCH MLS/HR: .375; 3 INJECTION, POWDER, LYOPHILIZED, FOR SOLUTION INTRAVENOUS at 14:08

## 2020-10-18 RX ADMIN — Medication SCH STRIP: at 08:05

## 2020-10-18 RX ADMIN — SODIUM CHLORIDE SCH MLS/HR: 0.9 INJECTION, SOLUTION INTRAVENOUS at 10:23

## 2020-10-18 RX ADMIN — DOBUTAMINE IN DEXTROSE SCH MLS/HR: 100 INJECTION, SOLUTION INTRAVENOUS at 20:40

## 2020-10-18 RX ADMIN — EPINEPHRINE SCH MLS/HR: 1 INJECTION, SOLUTION, CONCENTRATE INTRAVENOUS at 10:24

## 2020-10-18 RX ADMIN — EPINEPHRINE SCH MLS/HR: 1 INJECTION, SOLUTION, CONCENTRATE INTRAVENOUS at 16:52

## 2020-10-18 RX ADMIN — DOBUTAMINE IN DEXTROSE SCH MLS/HR: 100 INJECTION, SOLUTION INTRAVENOUS at 06:08

## 2020-10-18 RX ADMIN — PIPERACILLIN SODIUM AND TAZOBACTAM SODIUM SCH MLS/HR: .375; 3 INJECTION, POWDER, LYOPHILIZED, FOR SOLUTION INTRAVENOUS at 01:15

## 2020-10-18 RX ADMIN — DOBUTAMINE IN DEXTROSE SCH MLS/HR: 100 INJECTION, SOLUTION INTRAVENOUS at 10:25

## 2020-10-18 RX ADMIN — DOBUTAMINE IN DEXTROSE SCH MLS/HR: 100 INJECTION, SOLUTION INTRAVENOUS at 12:48

## 2020-10-18 RX ADMIN — DOPAMINE HYDROCHLORIDE IN DEXTROSE SCH MLS/HR: 1.6 INJECTION, SOLUTION INTRAVENOUS at 02:30

## 2020-10-18 RX ADMIN — DOPAMINE HYDROCHLORIDE IN DEXTROSE SCH MLS/HR: 1.6 INJECTION, SOLUTION INTRAVENOUS at 12:47

## 2020-10-18 RX ADMIN — DOBUTAMINE IN DEXTROSE SCH MLS/HR: 100 INJECTION, SOLUTION INTRAVENOUS at 02:40

## 2020-10-18 RX ADMIN — SODIUM CHLORIDE SCH MLS/HR: 0.9 INJECTION, SOLUTION INTRAVENOUS at 21:31

## 2020-10-18 RX ADMIN — HUMAN INSULIN SCH UNITS: 100 INJECTION, SOLUTION SUBCUTANEOUS at 01:15

## 2020-10-18 RX ADMIN — DOPAMINE HYDROCHLORIDE IN DEXTROSE SCH MLS/HR: 1.6 INJECTION, SOLUTION INTRAVENOUS at 06:07

## 2020-10-18 RX ADMIN — DOPAMINE HYDROCHLORIDE IN DEXTROSE SCH MLS/HR: 1.6 INJECTION, SOLUTION INTRAVENOUS at 10:24

## 2020-10-18 RX ADMIN — SODIUM CHLORIDE SCH MLS/HR: 0.9 INJECTION, SOLUTION INTRAVENOUS at 19:47

## 2020-10-18 RX ADMIN — DEXTROSE SCH MLS/HR: 5 SOLUTION INTRAVENOUS at 01:45

## 2020-10-18 RX ADMIN — PIPERACILLIN SODIUM AND TAZOBACTAM SODIUM SCH MLS/HR: .375; 3 INJECTION, POWDER, LYOPHILIZED, FOR SOLUTION INTRAVENOUS at 06:00

## 2020-10-18 RX ADMIN — DOBUTAMINE IN DEXTROSE SCH MLS/HR: 100 INJECTION, SOLUTION INTRAVENOUS at 08:35

## 2020-10-18 RX ADMIN — Medication SCH STRIP: at 04:00

## 2020-10-18 RX ADMIN — EPINEPHRINE SCH MLS/HR: 1 INJECTION, SOLUTION, CONCENTRATE INTRAVENOUS at 03:53

## 2020-10-18 NOTE — NUR
Respiratory note:

AT BEDSIDE FOR END OF SHIFT VENT CHECK. PT IS NOW IN ISOLATION. PT BEING RULED OUT FOR 
COVID. VENT CHECK DONE FROM PTS ROOM DOOR DUE TO PRECAUTIONS FOR COVID. PTS CURRENT TEMP 
READS 98.4F. CRASH CART REMAINS AT BEDSIDE. NO CHANGES MADE. WILL HAVE DAY SHIFT CONTINUE 
POC.

## 2020-10-18 NOTE — NUR
Patient remains unstable for position change. Current bp 65/10 on Multiple pressors at max 
dose. 

-------------------------------------------------------------------------------

Addendum: 10/18/20 at 1032 by Dyan Monge RN

-------------------------------------------------------------------------------

Amended: Links added.

## 2020-10-18 NOTE — NUR
Respiratory note:

AT BEDSIDE FOR ROUTINE VENT CHECK. PTS CURRENT TEMP READS 97.3F. CRASH CART REMAINS AT 
BEDSIDE. NO CHANGES MADE. FAMILY MEMBER AT BEDSIDE. WILL CONTINUE TO MONITOR.

## 2020-10-18 NOTE — NUR
UNABLE TO REPOSITION

 PATIENT UNSTABLE FOR POSITION CHANGE AT THIS TIME. BP 50'S/ 20'S ON MULTIPLE PRESSORS. 
POSITION CHANGE HELD THROUGHOUT SHIFT IF BP DOES NOT STABILIZE.

## 2020-10-18 NOTE — NUR
Nutrition Assessment Notes



Please refer to link for full assessment notes.



Est Energy needs: 6050-4301 kcals (17-20 kcal/kgBW)

Est Protein needs: 76-86 gms/day (0.8-0.9 gm/kgBW) d/t pt with elev RFTs, low GFR



Will continue to monitor and reassess prn.

-------------------------------------------------------------------------------

Addendum: 10/18/20 at 1112 by Ruth Marte RD

-------------------------------------------------------------------------------

Amended: Links added.

## 2020-10-18 NOTE — NUR
MD AT BEDSIDE

 DR. MERRILL UPDATED ON PATIENTS STATUS WITH ADDITIONAL PRESSORS ADDED SINCE PREVIOUS ROUNDS. 
MD AWARE OF BLOOD SUGAR. PER MD SS COVERAGE TO BE FOLLOWED. DEXTROSE 5% TO BE CONTINUED WITH 
BICARB AT THIS TIME. SEE MD ORDERS/ NOTES.

## 2020-10-18 NOTE — NUR
REPORT OBTAINED



 UPDATED ON PATIENTS OCCURRENCE OVER NIGHT AND NEED FOR ADDITIONAL PRESSORS WITH MAX. DOSES. 
PATIENTS HR  AND BP RANGING 50'S/20'S. REMAINS MECHANICALLY VENTILATED.  PATIENT 
REMAINS FULL CODE WITH CRASH CART AT BEDSIDE AND PATIENT BEING RULED OUT FOR COVID. SEE 
ASSESSMENT/FURTHER NOTES.

## 2020-10-18 NOTE — NUR
FAMILY

 MOTHER AT BEDSIDE. QUESTIONS AND CONCERNS ADDRESSED. MOTHER AWARE OF PATIENTS CRITICAL 
CONDITION AT THIS TIME.

 MOTHER GIVEN PATIENTS BELONGINGS BROUGHT FROM ER

 2 VISA CARDS

  LICENCE 

 TWO HOOP METAL EARRINGS.

## 2020-10-18 NOTE — NUR
[facility] UPDATE

 ARMANDO UPDATED ON PATIENT STATUS. IF ANY CHANGES IN PATIENTS CONDITION [facility] TO BE 
NOTIFIED.

## 2020-10-18 NOTE — NUR
Respiratory note:

VENT CHANGES MADE PER DR CLAY. CHANGED FROM AC TO PC RATE 24, 20/10 I TIME OF 1:1. ABG 
IN ONE HOUR.

## 2020-10-18 NOTE — NUR
HYPERGLYCEMIA

 PATIENTS BLOOD SUGAR CONTINUES TO BE ELEVATED.  SLIDING SCALE COVERAGE GIVEN. MD PAGED, 
AWAITING CALLBACK.

## 2020-10-18 NOTE — NUR
TURN ATTEMPTED

 PATIENTS MAP GREATER BP STABILIZED ON THIS TIME. SMALL TURN TO RIGHT DONE TO ATTEMPT TO OFF 
LOAD PRESSURE. UNABLE TO TURN ENOUGH TO ASSESS SACRUM. WILL CONTINUE TO MONITOR CLOSELY FOR 
ANY SIGNS OF ACTIVITY NOT TOLERATED.

## 2020-10-18 NOTE — NUR
FAMILY

 MOTHER ASKING RE: EEG RESULTS. MOTHER REQUESTING TO SPEAK WITH NEUROLOGIST. MOTHER AWARE 
PATIENT IS TOO UNSTABLE FOR HEAD CT TRANSPORT AT THIS TIME. 

 MD PAGED PER MOTHER REQUEST. EDUCATED MOTHER THAT EEG RESULTS ARE TOOLS USED AND RESULTS 
WILL NOT CHANGE SIGNIFICANT FINDS. MOTHER VERBALIZED UNDERSTANDING AND STATING," I JUST WANT 
TO GIVE HER A CHANCE".

## 2020-10-19 VITALS — SYSTOLIC BLOOD PRESSURE: 72 MMHG | DIASTOLIC BLOOD PRESSURE: 31 MMHG

## 2020-10-19 VITALS — SYSTOLIC BLOOD PRESSURE: 93 MMHG | DIASTOLIC BLOOD PRESSURE: 35 MMHG

## 2020-10-19 VITALS — SYSTOLIC BLOOD PRESSURE: 92 MMHG | DIASTOLIC BLOOD PRESSURE: 37 MMHG

## 2020-10-19 VITALS — SYSTOLIC BLOOD PRESSURE: 73 MMHG | DIASTOLIC BLOOD PRESSURE: 32 MMHG

## 2020-10-19 VITALS — DIASTOLIC BLOOD PRESSURE: 29 MMHG | SYSTOLIC BLOOD PRESSURE: 63 MMHG

## 2020-10-19 VITALS — SYSTOLIC BLOOD PRESSURE: 76 MMHG | DIASTOLIC BLOOD PRESSURE: 33 MMHG

## 2020-10-19 VITALS — SYSTOLIC BLOOD PRESSURE: 94 MMHG | DIASTOLIC BLOOD PRESSURE: 36 MMHG

## 2020-10-19 VITALS — DIASTOLIC BLOOD PRESSURE: 35 MMHG | SYSTOLIC BLOOD PRESSURE: 93 MMHG

## 2020-10-19 VITALS — DIASTOLIC BLOOD PRESSURE: 35 MMHG | SYSTOLIC BLOOD PRESSURE: 82 MMHG

## 2020-10-19 VITALS — SYSTOLIC BLOOD PRESSURE: 75 MMHG | DIASTOLIC BLOOD PRESSURE: 31 MMHG

## 2020-10-19 VITALS — DIASTOLIC BLOOD PRESSURE: 33 MMHG | SYSTOLIC BLOOD PRESSURE: 76 MMHG

## 2020-10-19 VITALS — DIASTOLIC BLOOD PRESSURE: 36 MMHG | SYSTOLIC BLOOD PRESSURE: 94 MMHG

## 2020-10-19 VITALS — DIASTOLIC BLOOD PRESSURE: 37 MMHG | SYSTOLIC BLOOD PRESSURE: 96 MMHG

## 2020-10-19 VITALS — SYSTOLIC BLOOD PRESSURE: 84 MMHG | DIASTOLIC BLOOD PRESSURE: 35 MMHG

## 2020-10-19 VITALS — DIASTOLIC BLOOD PRESSURE: 37 MMHG | SYSTOLIC BLOOD PRESSURE: 93 MMHG

## 2020-10-19 VITALS — DIASTOLIC BLOOD PRESSURE: 36 MMHG | SYSTOLIC BLOOD PRESSURE: 92 MMHG

## 2020-10-19 VITALS — DIASTOLIC BLOOD PRESSURE: 36 MMHG | SYSTOLIC BLOOD PRESSURE: 100 MMHG

## 2020-10-19 VITALS — DIASTOLIC BLOOD PRESSURE: 31 MMHG | SYSTOLIC BLOOD PRESSURE: 76 MMHG

## 2020-10-19 VITALS — SYSTOLIC BLOOD PRESSURE: 75 MMHG | DIASTOLIC BLOOD PRESSURE: 32 MMHG

## 2020-10-19 VITALS — SYSTOLIC BLOOD PRESSURE: 97 MMHG | DIASTOLIC BLOOD PRESSURE: 37 MMHG

## 2020-10-19 VITALS — SYSTOLIC BLOOD PRESSURE: 96 MMHG | DIASTOLIC BLOOD PRESSURE: 37 MMHG

## 2020-10-19 VITALS — DIASTOLIC BLOOD PRESSURE: 29 MMHG | SYSTOLIC BLOOD PRESSURE: 76 MMHG

## 2020-10-19 VITALS — SYSTOLIC BLOOD PRESSURE: 83 MMHG | DIASTOLIC BLOOD PRESSURE: 31 MMHG

## 2020-10-19 VITALS — SYSTOLIC BLOOD PRESSURE: 95 MMHG | DIASTOLIC BLOOD PRESSURE: 37 MMHG

## 2020-10-19 VITALS — SYSTOLIC BLOOD PRESSURE: 70 MMHG | DIASTOLIC BLOOD PRESSURE: 32 MMHG

## 2020-10-19 VITALS — SYSTOLIC BLOOD PRESSURE: 77 MMHG | DIASTOLIC BLOOD PRESSURE: 34 MMHG

## 2020-10-19 VITALS — DIASTOLIC BLOOD PRESSURE: 30 MMHG | SYSTOLIC BLOOD PRESSURE: 75 MMHG

## 2020-10-19 VITALS — SYSTOLIC BLOOD PRESSURE: 87 MMHG | DIASTOLIC BLOOD PRESSURE: 34 MMHG

## 2020-10-19 VITALS — SYSTOLIC BLOOD PRESSURE: 95 MMHG | DIASTOLIC BLOOD PRESSURE: 36 MMHG

## 2020-10-19 VITALS — DIASTOLIC BLOOD PRESSURE: 35 MMHG | SYSTOLIC BLOOD PRESSURE: 80 MMHG

## 2020-10-19 VITALS — SYSTOLIC BLOOD PRESSURE: 78 MMHG | DIASTOLIC BLOOD PRESSURE: 29 MMHG

## 2020-10-19 VITALS — SYSTOLIC BLOOD PRESSURE: 82 MMHG | DIASTOLIC BLOOD PRESSURE: 35 MMHG

## 2020-10-19 VITALS — DIASTOLIC BLOOD PRESSURE: 36 MMHG | SYSTOLIC BLOOD PRESSURE: 96 MMHG

## 2020-10-19 LAB
ALBUMIN SERPL-MCNC: 1.5 G/DL (ref 3.4–5)
ALP SERPL-CCNC: 61 U/L (ref 45–117)
ANION GAP SERPL CALCULATED.3IONS-SCNC: 25 MMOL/L (ref 5–15)
APTT PPP: 59.9 SEC (ref 23–31.2)
BILIRUB SERPL-MCNC: 3.5 MG/DL (ref 0.2–1)
BUN SERPL-MCNC: 15 MG/DL (ref 7–18)
BUN/CREAT SERPL: 2.6
CALCIUM SERPL-MCNC: 5.1 MG/DL (ref 8.5–10.1)
CHLORIDE SERPL-SCNC: 83 MMOL/L (ref 98–107)
CO2 SERPL-SCNC: 12 MMOL/L (ref 21–32)
GLUCOSE SERPL-MCNC: 500 MG/DL (ref 74–106)
HCT VFR BLD AUTO: 29.5 % (ref 36–46)
HGB BLD-MCNC: 9.5 G/DL (ref 12.2–16.2)
INR PPP: 3.32 (ref 0.9–1.15)
MCH RBC QN AUTO: 27.9 PG (ref 28–32)
MCV RBC AUTO: 86.8 FL (ref 80–100)
POTASSIUM SERPL-SCNC: 3.1 MMOL/L (ref 3.5–5.1)
PROT SERPL-MCNC: 3.5 G/DL (ref 6.4–8.2)
SODIUM SERPL-SCNC: 120 MMOL/L (ref 136–145)

## 2020-10-19 RX ADMIN — Medication SCH STRIP: at 00:05

## 2020-10-19 RX ADMIN — DEXTROSE SCH MLS/HR: 5 SOLUTION INTRAVENOUS at 06:47

## 2020-10-19 RX ADMIN — DEXTROSE SCH MLS/HR: 5 SOLUTION INTRAVENOUS at 00:00

## 2020-10-19 RX ADMIN — SODIUM CHLORIDE SCH MLS/HR: 0.9 INJECTION, SOLUTION INTRAVENOUS at 05:21

## 2020-10-19 RX ADMIN — DOPAMINE HYDROCHLORIDE IN DEXTROSE SCH MLS/HR: 1.6 INJECTION, SOLUTION INTRAVENOUS at 04:00

## 2020-10-19 RX ADMIN — DEXTROSE SCH MLS/HR: 5 SOLUTION INTRAVENOUS at 03:57

## 2020-10-19 RX ADMIN — DOPAMINE HYDROCHLORIDE IN DEXTROSE SCH MLS/HR: 1.6 INJECTION, SOLUTION INTRAVENOUS at 00:15

## 2020-10-19 RX ADMIN — DOPAMINE HYDROCHLORIDE IN DEXTROSE SCH MLS/HR: 1.6 INJECTION, SOLUTION INTRAVENOUS at 07:30

## 2020-10-19 RX ADMIN — EPINEPHRINE SCH MLS/HR: 1 INJECTION, SOLUTION, CONCENTRATE INTRAVENOUS at 00:27

## 2020-10-19 RX ADMIN — PIPERACILLIN SODIUM AND TAZOBACTAM SODIUM SCH MLS/HR: .25; 2 INJECTION, POWDER, LYOPHILIZED, FOR SOLUTION INTRAVENOUS at 06:03

## 2020-10-19 RX ADMIN — SODIUM CHLORIDE SCH MLS/HR: 0.9 INJECTION, SOLUTION INTRAVENOUS at 01:25

## 2020-10-19 RX ADMIN — DEXTROSE SCH MLS/HR: 50 INJECTION, SOLUTION INTRAVENOUS at 02:57

## 2020-10-19 RX ADMIN — PIPERACILLIN SODIUM AND TAZOBACTAM SODIUM SCH MLS/HR: .25; 2 INJECTION, POWDER, LYOPHILIZED, FOR SOLUTION INTRAVENOUS at 00:04

## 2020-10-19 RX ADMIN — DEXTROSE SCH MLS/HR: 5 SOLUTION INTRAVENOUS at 04:30

## 2020-10-19 RX ADMIN — Medication SCH STRIP: at 03:16

## 2020-10-19 RX ADMIN — EPINEPHRINE SCH MLS/HR: 1 INJECTION, SOLUTION, CONCENTRATE INTRAVENOUS at 06:23

## 2020-10-19 RX ADMIN — SODIUM CHLORIDE SCH MLS/HR: 0.9 INJECTION, SOLUTION INTRAVENOUS at 04:30

## 2020-10-19 RX ADMIN — DEXTROSE SCH MLS/HR: 5 SOLUTION INTRAVENOUS at 02:07

## 2020-10-19 RX ADMIN — Medication SCH STRIP: at 01:38

## 2020-10-19 NOTE — NUR
TURN ATTEMPTED

 PATIENTS MAP CURRENTLY 46 BP NOT STABILIZED AT THIS TIME. SMALL TURN TO RIGHT DONE TO 
ATTEMPT TO OFF LOAD PRESSURE. UNABLE TO TURN ENOUGH TO ASSESS SACRUM. WILL CONTINUE TO 
MONITOR CLOSELY FOR ANY SIGNS OF ACTIVITY NOT TOLERATED.

## 2020-10-19 NOTE — NUR
TEACHING

PT NON RESPONSIVE ON MECHANICAL VENTILATOR AND DOES NOT BENEFIT FROM TEACHING AT THIS TIME. 
NO SECOND LEARNER PRESENT AT BEDSIDE FOR TEACHING OPPORTUNITIES. WILL CONTINUE TO MONITOR 
AND ASSESS FOR EDUCATION OPPORTUNITIES

-------------------------------------------------------------------------------

Addendum: 10/19/20 at 0148 by Maria Luz Zapata RN

-------------------------------------------------------------------------------

Amended: Links added.

## 2020-10-19 NOTE — NUR
PATIENT PRONOUNCED 

PATIENT PRONOUNCED AT 0841 BY DR. BOYLE. MULTIPLE ROUNDS OF EPI, CALCIUM AND BI CARB 
GIVEN.

## 2020-10-19 NOTE — NUR
CALLED BACK AND STATED THAT PATIENT IS GOING TO NOW BE A CORONERS CASE. CLARA PUGH DIRECTOR ICU AND HOUSE LINK WHITNEY MADE AWARE.

## 2020-10-19 NOTE — NUR
Code Blue note.

Please see CODE sheets and Provider/MD notes regarding patient code.  Family notified of 
code blue and updated on patient status post code.

## 2020-10-19 NOTE — NUR
[facility] CALLED REGARDING PATIENTS DEATH. SPOKE WITH ANDREA WHO STATES THAT PATIENT IS A 
CANDIDATE FOR TISSUE DONATION AND SHE WILL CALL BACK IN ABOUT ONE HOUR OR SO.

## 2020-10-19 NOTE — NUR
[facility] CALLED SPOKE WITH GUIDO WHO STATES THAT THEY WILL REACH OUT TO MOTHER ONCE SHE IS 
NOT AT BEDSIDE BECAUSE PATIENT WAS A REGISTERED DONOR.

## 2020-10-20 NOTE — NUR
assessment

Per  consult ETOH/substance abuse. Patient has been on a vent when ss consult was put it. 
Patient  yesterday 10/19/2020.

-------------------------------------------------------------------------------

Addendum: 10/20/20 at 1115 by Monique Gallardo 

-------------------------------------------------------------------------------

Amended: Links added.

## 2021-11-18 NOTE — NUR
Labs are either normal or stable and at goal.  Keep up the good work!   Continue your current medications Pulmonologist paged re: blood gas.